# Patient Record
Sex: FEMALE | Race: WHITE | URBAN - METROPOLITAN AREA
[De-identification: names, ages, dates, MRNs, and addresses within clinical notes are randomized per-mention and may not be internally consistent; named-entity substitution may affect disease eponyms.]

---

## 2021-06-11 ENCOUNTER — OFFICE VISIT (OUTPATIENT)
Dept: INTERNAL MEDICINE CLINIC | Age: 31
End: 2021-06-11
Payer: COMMERCIAL

## 2021-06-11 VITALS
SYSTOLIC BLOOD PRESSURE: 101 MMHG | OXYGEN SATURATION: 100 % | HEIGHT: 70 IN | TEMPERATURE: 98.6 F | HEART RATE: 65 BPM | BODY MASS INDEX: 21.86 KG/M2 | DIASTOLIC BLOOD PRESSURE: 76 MMHG | WEIGHT: 152.7 LBS | RESPIRATION RATE: 16 BRPM

## 2021-06-11 DIAGNOSIS — Z76.89 ESTABLISHING CARE WITH NEW DOCTOR, ENCOUNTER FOR: Primary | ICD-10-CM

## 2021-06-11 DIAGNOSIS — F32.A ANXIETY AND DEPRESSION: ICD-10-CM

## 2021-06-11 DIAGNOSIS — Z30.41 USES ORAL CONTRACEPTION: ICD-10-CM

## 2021-06-11 DIAGNOSIS — F41.9 ANXIETY AND DEPRESSION: ICD-10-CM

## 2021-06-11 DIAGNOSIS — Z79.899 ENCOUNTER FOR LONG-TERM (CURRENT) USE OF OTHER MEDICATIONS: ICD-10-CM

## 2021-06-11 DIAGNOSIS — Z13.220 LIPID SCREENING: ICD-10-CM

## 2021-06-11 PROCEDURE — 99203 OFFICE O/P NEW LOW 30 MIN: CPT | Performed by: FAMILY MEDICINE

## 2021-06-11 RX ORDER — NORETHINDRONE ACETATE AND ETHINYL ESTRADIOL 1MG-20(21)
1 KIT ORAL DAILY
COMMUNITY
End: 2022-04-27 | Stop reason: SDUPTHER

## 2021-06-11 RX ORDER — FLUOXETINE HYDROCHLORIDE 20 MG/1
1 CAPSULE ORAL DAILY
COMMUNITY
End: 2021-08-19 | Stop reason: SDUPTHER

## 2021-06-11 NOTE — PROGRESS NOTES
1. Have you been to the ER, urgent care clinic since your last visit? Hospitalized since your last visit? No    2. Have you seen or consulted any other health care providers outside of the [address] [place] [address] since your last visit? Include any pap smears or colon screening. No  Chief Complaint   Patient presents with   [address]     establishing a new pcp     Visit Vitals  /76   Pulse 65   Temp 98.6 °F (37 °C) (Oral)   Resp 16   Ht 5' 10\" (1.778 m)   Wt 152 lb 11.2 oz (69.3 kg)   SpO2 100%   BMI 21.91 kg/m²     Pt has PCOS, this is why she is on birth control.

## 2021-06-11 NOTE — PROGRESS NOTES
SPORTS MEDICINE [facility] PRIMARY [facility]ARE  Linda Robles MD  [address]  [id number]    Chief Complaint   Patient presents with   [address]     establishing a new pcp       SUBJECTIVE:    Sylvester Arboleda is a 36 y.o. female for new patient visit. Past medical history of anxiety, depression and OCP therapy. Current Outpatient Medications   Medication Sig Dispense Refill    FLUoxetine (PROzac) 20 mg capsule Take 1 Capsule by mouth daily.  norethindrone-ethinyl estradiol (JUNEL FE 1/20) 1 mg-20 mcg (21)/75 mg (7) tab Take 1 Tablet by mouth daily. Past Medical History:   Diagnosis Date    Depression     PCOS (polycystic ovarian syndrome)      History reviewed. No pertinent surgical history.   No Known Allergies    REVIEW OF SYSTEMS:  General: negative for - chills or fever  ENT: negative for - headaches, nasal congestion, tinnitus, hearing loss, vision changes, sore throat  Respiratory: negative for - cough, hemoptysis, shortness of breath or wheezing  Cardiovascular : negative for - chest pain, edema, palpitations or shortness of breath  Gastrointestinal: negative for - abdominal pain, blood in stools, heartburn or nausea/vomiting, diarrhea, constipation  Genito-Urinary: menstrual irregularity; no dysuria, trouble voiding, hematuria or   Musculoskeletal: negative for - gait disturbance, joint pain, joint stiffness , joint swelling, muscle aches  Neurological: no TIA or stroke symptoms  Hematologic: no bruises, no bleeding, no swollen glands  Integument: no lumps, mole changes, nail changes or rash  Endocrine:lethargy; no malaise/ or unexpected weight changes      Social History     Socioeconomic History    Marital status: SINGLE     Spouse name: Not on file    Number of children: Not on file    Years of education: Not on file 22 yr        loking for work   [employer] education level: Not on file   Tobacco Use    Smoking status: Never Smoker    Smokeless tobacco: Never Used   Vaping Use  Vaping Use: Never used   Substance and Sexual Activity    Alcohol use: Yes     Alcohol/week: 2.0 standard drinks     Types: 2 Glasses of wine per week     Comment: socially    Drug use: Never    Sexual activity: Yes     Partners: Male     Birth control/protection: Pill     Social Determinants of Health     Financial Resource Strain:     Difficulty of Paying Living Expenses:    Food Insecurity:     Worried About Running Out of Food in the Last Year:     [address] in the Last Year:    Transportation Needs:     Lack of Transportation (Medical):  Lack of Transportation (Non-Medical):    Physical Activity:     Days of Exercise per Week:     Minutes of Exercise per Session:    Stress:     Feeling of Stress :    Social Connections:     Frequency of Communication with Friends and Family:     Frequency of Social Gatherings with Friends and Family:     Attends Mosque Services:     Active Member of Clubs or Organizations:     Attends Club or Organization Meetings:     Marital Status:      Family History   Problem Relation Age of Onset    Deep Vein Thrombosis Father     Psychiatric Disorder Sister        OBJECTIVE:     Visit Vitals  /76   Pulse 65   Temp 98.6 °F (37 °C) (Oral)   Resp 16   Ht 5' 10\" (1.778 m)   Wt 152 lb 11.2 oz (69.3 kg)   LMP 05/23/2021 (Exact Date)   SpO2 100%   BMI 21.91 kg/m²     CONSTITUTIONAL: well developed, well nourished, appears age appropriate  EYES: perrla, eom intact  ENMT:moist mucous membranes, pharynx clear  NECK: supple. Thyroid normal  RESPIRATORY: Chest: clear bilaterally  CARDIOVASCULAR: Heart: regular rate and rhythm, pulse 2+  GASTROINTESTINAL: Abdomen: soft, bowel sounds active  HEMATOLOGIC: no pathological lymph nodes palpated  MUSCULOSKELETAL: Extremities: no edema,    INTEGUMENT: No unusual rashes or suspicious  lesions on visible skin noted.  Nails appear normal.  NEUROLOGIC: non-focal exam   MENTAL STATUS: alert and oriented, appropriate affect Office Visit on 06/11/2021   Component Date Value Ref Range Status    Color 06/11/2021 YELLOW/STRAW    Final    Color Reference Range: Straw, Yellow or Dark Yellow    Appearance 06/11/2021 CLOUDY* CLEAR   Final    Specific gravity 06/11/2021 1.008  1.003 - 1.030   Final    pH (UA) 06/11/2021 7.5  5.0 - 8.0   Final    Protein 06/11/2021 Negative  Negative mg/dL Final    Glucose 06/11/2021 Negative  Negative mg/dL Final    Ketone 06/11/2021 Negative  Negative mg/dL Final    Bilirubin 06/11/2021 Negative  Negative   Final    Blood 06/11/2021 SMALL* Negative   Final    Urobilinogen 06/11/2021 0.2  0.2 - 1.0 EU/dL Final    Nitrites 06/11/2021 Negative  Negative   Final    Leukocyte Esterase 06/11/2021 TRACE* Negative   Final    WBC 06/11/2021 5-10  0 - 4 /hpf Final    RBC 06/11/2021 0-5  0 - 5 /hpf Final    Epithelial cells 06/11/2021 MODERATE* FEW /lpf Final    Bacteria 06/11/2021 3+* Negative /hpf Final    Cholesterol, total 06/11/2021 208* <200 MG/DL Final    Triglyceride 06/11/2021 68  <150 MG/DL Final    Comment: Based on NCEP-ATP III:  Triglycerides <150 mg/dL  is considered normal, 150-199  mg/dL  borderline high,  200-499 mg/dL high and  greater than or equal to 500  mg/dL very high.  HDL Cholesterol 06/11/2021 93  MG/DL Final    Comment: Based on NCEP ATP III, HDL Cholesterol <40 mg/dL is considered low and >60  mg/dL is elevated.       LDL, calculated 06/11/2021 101.4* 0 - 100 MG/DL Final    Comment: Based on the NCEP-ATP: LDL-C concentrations are considered  optimal <100 mg/dL,  near optimal/above Normal 100-129 mg/dL Borderline High: 130-159, High: 160-189  mg/dL Very High: Greater than or equal to 190 mg/dL      VLDL, calculated 06/11/2021 13.6  MG/DL Final    CHOL/HDL Ratio 06/11/2021 2.2  0.0 - 5.0   Final    Sodium 06/11/2021 137  136 - 145 mmol/L Final    Potassium 06/11/2021 4.6  3.5 - 5.1 mmol/L Final    Chloride 06/11/2021 105  97 - 108 mmol/L Final    CO2 06/11/2021 27  21 - 32 mmol/L Final    Anion gap 06/11/2021 5  5 - 15 mmol/L Final    Glucose 06/11/2021 80  65 - 100 mg/dL Final    BUN 06/11/2021 13  6 - 20 MG/DL Final    Creatinine 06/11/2021 0.78  0.55 - 1.02 MG/DL Final    BUN/Creatinine ratio 06/11/2021 17  12 - 20   Final    GFR est AA 06/11/2021 >60  >60 ml/min/1.73m2 Final    GFR est non-AA 06/11/2021 >60  >60 ml/min/1.73m2 Final    Comment: Estimated GFR is calculated using the IDMS-traceable Modification of Diet in  Renal Disease (MDRD) Study equation, reported for both  Americans  (GFRAA) and non- Americans (GFRNA), and normalized to 1.73m2 body  surface area. The physician must decide which value applies to the patient.  Calcium 06/11/2021 9.6  8.5 - 10.1 MG/DL Final    Bilirubin, total 06/11/2021 1.4* 0.2 - 1.0 MG/DL Final    ALT (SGPT) 06/11/2021 21  12 - 78 U/L Final    AST (SGOT) 06/11/2021 15  15 - 37 U/L Final    Alk.  phosphatase 06/11/2021 36* 45 - 117 U/L Final    Protein, total 06/11/2021 8.0  6.4 - 8.2 g/dL Final    Albumin 06/11/2021 4.4  3.5 - 5.0 g/dL Final    Globulin 06/11/2021 3.6  2.0 - 4.0 g/dL Final    A-G Ratio 06/11/2021 1.2  1.1 - 2.2   Final    WBC 06/11/2021 4.6  3.6 - 11.0 K/uL Final    RBC 06/11/2021 4.56  3.80 - 5.20 M/uL Final    HGB 06/11/2021 13.9  11.5 - 16.0 g/dL Final    HCT 06/11/2021 41.5  35.0 - 47.0 % Final    MCV 06/11/2021 91.0  80.0 - 99.0 FL Final    MCH 06/11/2021 30.5  26.0 - 34.0 PG Final    MCHC 06/11/2021 33.5  30.0 - 36.5 g/dL Final    RDW 06/11/2021 12.8  11.5 - 14.5 % Final    PLATELET 71/23/3188 215* 150 - 400 K/uL Final    RESULTS VERIFIED BY SMEAR    NRBC 06/11/2021 0.0  0  WBC Final    ABSOLUTE NRBC 06/11/2021 0.00  0.00 - 0.01 K/uL Final    NEUTROPHILS 06/11/2021 61  32 - 75 % Final    LYMPHOCYTES 06/11/2021 31  12 - 49 % Final    MONOCYTES 06/11/2021 6  5 - 13 % Final    EOSINOPHILS 06/11/2021 1  0 - 7 % Final    BASOPHILS 06/11/2021 1  0 - 1 % Final  IMMATURE GRANULOCYTES 06/11/2021 0  0.0 - 0.5 % Final    ABS. NEUTROPHILS 06/11/2021 2.8  1.8 - 8.0 K/UL Final    ABS. LYMPHOCYTES 06/11/2021 1.4  0.8 - 3.5 K/UL Final    ABS. MONOCYTES 06/11/2021 0.3  0.0 - 1.0 K/UL Final    ABS. EOSINOPHILS 06/11/2021 0.1  0.0 - 0.4 K/UL Final    ABS. BASOPHILS 06/11/2021 0.0  0.0 - 0.1 K/UL Final    ABS. IMM. GRANS. 06/11/2021 0.0  0.00 - 0.04 K/UL Final    DF 06/11/2021 AUTOMATED    Final       ASSESSMENT:   1. Establishing care with new doctor, encounter for    2. Encounter for long-term (current) use of other medications    3. Lipid screening    4. Anxiety and depression -stable   5. Uses oral contraception -stable         PLAN:  .  Orders Placed This Encounter    CBC WITH AUTOMATED DIFF    METABOLIC PANEL, COMPREHENSIVE    LIPID PANEL    URINALYSIS W/ RFLX MICROSCOPIC    FLUoxetine (PROzac) 20 mg capsule    norethindrone-ethinyl estradiol (JUNEL FE 1/20) 1 mg-20 mcg (21)/75 mg (7) tab           I have discussed the diagnosis with the patient and the intended plan as seen in the  orders above. The patient understands and agrees with the plan. The patient has   received an after visit summary. Questions were answered concerning  future plans  Patient labs and/or xrays were reviewed as available. Past records were reviewed as available. Counseled regarding  healthy lifestyle          Advised patient to call back or return to office if symptoms develop/worsen/change/persist.           Serene Guillen M.D. This note was created using voice recognition software.   Edits have been made but syntax errors might exist.

## 2021-06-12 LAB
ALBUMIN SERPL-MCNC: 4.4 G/DL (ref 3.5–5)
ALBUMIN/GLOB SERPL: 1.2 {RATIO} (ref 1.1–2.2)
ALP SERPL-CCNC: 36 U/L (ref 45–117)
ALT SERPL-CCNC: 21 U/L (ref 12–78)
ANION GAP SERPL CALC-SCNC: 5 MMOL/L (ref 5–15)
APPEARANCE UR: ABNORMAL
AST SERPL-CCNC: 15 U/L (ref 15–37)
BACTERIA URNS QL MICRO: ABNORMAL /HPF
BASOPHILS # BLD: 0 K/UL (ref 0–0.1)
BASOPHILS NFR BLD: 1 % (ref 0–1)
BILIRUB SERPL-MCNC: 1.4 MG/DL (ref 0.2–1)
BILIRUB UR QL: NEGATIVE
BUN SERPL-MCNC: 13 MG/DL (ref 6–20)
BUN/CREAT SERPL: 17 (ref 12–20)
CALCIUM SERPL-MCNC: 9.6 MG/DL (ref 8.5–10.1)
CHLORIDE SERPL-SCNC: 105 MMOL/L (ref 97–108)
CHOLEST SERPL-MCNC: 208 MG/DL
CO2 SERPL-SCNC: 27 MMOL/L (ref 21–32)
COLOR UR: ABNORMAL
CREAT SERPL-MCNC: 0.78 MG/DL (ref 0.55–1.02)
DIFFERENTIAL METHOD BLD: ABNORMAL
EOSINOPHIL # BLD: 0.1 K/UL (ref 0–0.4)
EOSINOPHIL NFR BLD: 1 % (ref 0–7)
EPITH CASTS URNS QL MICRO: ABNORMAL /LPF
ERYTHROCYTE [DISTWIDTH] IN BLOOD BY AUTOMATED COUNT: 12.8 % (ref 11.5–14.5)
GLOBULIN SER CALC-MCNC: 3.6 G/DL (ref 2–4)
GLUCOSE SERPL-MCNC: 80 MG/DL (ref 65–100)
GLUCOSE UR STRIP.AUTO-MCNC: NEGATIVE MG/DL
HCT VFR BLD AUTO: 41.5 % (ref 35–47)
HDLC SERPL-MCNC: 93 MG/DL
HDLC SERPL: 2.2 {RATIO} (ref 0–5)
HGB BLD-MCNC: 13.9 G/DL (ref 11.5–16)
HGB UR QL STRIP: ABNORMAL
IMM GRANULOCYTES # BLD AUTO: 0 K/UL (ref 0–0.04)
IMM GRANULOCYTES NFR BLD AUTO: 0 % (ref 0–0.5)
KETONES UR QL STRIP.AUTO: NEGATIVE MG/DL
LDLC SERPL CALC-MCNC: 101.4 MG/DL (ref 0–100)
LEUKOCYTE ESTERASE UR QL STRIP.AUTO: ABNORMAL
LYMPHOCYTES # BLD: 1.4 K/UL (ref 0.8–3.5)
LYMPHOCYTES NFR BLD: 31 % (ref 12–49)
MCH RBC QN AUTO: 30.5 PG (ref 26–34)
MCHC RBC AUTO-ENTMCNC: 33.5 G/DL (ref 30–36.5)
MCV RBC AUTO: 91 FL (ref 80–99)
MONOCYTES # BLD: 0.3 K/UL (ref 0–1)
MONOCYTES NFR BLD: 6 % (ref 5–13)
NEUTS SEG # BLD: 2.8 K/UL (ref 1.8–8)
NEUTS SEG NFR BLD: 61 % (ref 32–75)
NITRITE UR QL STRIP.AUTO: NEGATIVE
NRBC # BLD: 0 K/UL (ref 0–0.01)
NRBC BLD-RTO: 0 PER 100 WBC
PH UR STRIP: 7.5 [PH] (ref 5–8)
PLATELET # BLD AUTO: 138 K/UL (ref 150–400)
POTASSIUM SERPL-SCNC: 4.6 MMOL/L (ref 3.5–5.1)
PROT SERPL-MCNC: 8 G/DL (ref 6.4–8.2)
PROT UR STRIP-MCNC: NEGATIVE MG/DL
RBC # BLD AUTO: 4.56 M/UL (ref 3.8–5.2)
RBC #/AREA URNS HPF: ABNORMAL /HPF (ref 0–5)
SODIUM SERPL-SCNC: 137 MMOL/L (ref 136–145)
SP GR UR REFRACTOMETRY: 1.01 (ref 1–1.03)
TRIGL SERPL-MCNC: 68 MG/DL (ref ?–150)
UROBILINOGEN UR QL STRIP.AUTO: 0.2 EU/DL (ref 0.2–1)
VLDLC SERPL CALC-MCNC: 13.6 MG/DL
WBC # BLD AUTO: 4.6 K/UL (ref 3.6–11)
WBC URNS QL MICRO: ABNORMAL /HPF (ref 0–4)

## 2022-01-06 ENCOUNTER — OFFICE VISIT (OUTPATIENT)
Dept: INTERNAL MEDICINE CLINIC | Age: 32
End: 2022-01-06
Payer: COMMERCIAL

## 2022-01-06 ENCOUNTER — HOSPITAL ENCOUNTER (OUTPATIENT)
Dept: LAB | Age: 32
Discharge: HOME | End: 2022-01-06
Payer: COMMERCIAL

## 2022-01-06 VITALS
SYSTOLIC BLOOD PRESSURE: 106 MMHG | OXYGEN SATURATION: 100 % | HEIGHT: 70 IN | TEMPERATURE: 98.7 F | RESPIRATION RATE: 18 BRPM | BODY MASS INDEX: 24.31 KG/M2 | DIASTOLIC BLOOD PRESSURE: 73 MMHG | HEART RATE: 76 BPM | WEIGHT: 169.8 LBS

## 2022-01-06 DIAGNOSIS — Z11.59 ENCOUNTER FOR HEPATITIS C SCREENING TEST FOR LOW RISK PATIENT: ICD-10-CM

## 2022-01-06 DIAGNOSIS — N89.8 VAGINAL DISCHARGE: ICD-10-CM

## 2022-01-06 DIAGNOSIS — R17 SERUM TOTAL BILIRUBIN ELEVATED: ICD-10-CM

## 2022-01-06 DIAGNOSIS — Z12.4 CERVICAL CANCER SCREENING: Primary | ICD-10-CM

## 2022-01-06 DIAGNOSIS — D69.6 THROMBOCYTOPENIA (HCC): ICD-10-CM

## 2022-01-06 PROCEDURE — 99214 OFFICE O/P EST MOD 30 MIN: CPT | Performed by: FAMILY MEDICINE

## 2022-01-06 PROCEDURE — 88175 CYTOPATH C/V AUTO FLUID REDO: CPT

## 2022-01-06 NOTE — PATIENT INSTRUCTIONS
Learning About Cervical Cancer Screening  What is a cervical cancer screening test?     The cervix is the lower part of the uterus that opens into the vagina. Cervical cancer screening tests check the cells on the cervix for changes that could lead to cancer. Two tests can be used to screen for cervical cancer. They may be used alone or together. A Pap test.   This test looks for changes in the cells of the cervix. Some of these cell changes could lead to cancer. A human papillomavirus (HPV) test.   This test looks for the HPV virus. Some high-risk types of HPV can cause cell changes that could lead to cervical cancer. When should you have a screening test?  If you have a cervix, you may need cervical cancer screening. Screening will depend on many things. Ages 24 to 34  Screening options for these ages include:  · A Pap test. If your results are normal, you can wait 3 years to have another test.  · An HPV test beginning at age 22. If your results are negative, you can wait 5 years to have another test.  Ages 27 to 59  Screening options for these ages include:  · A Pap test. If your results are normal, you can wait 3 years to have another test.  · An HPV test. If your results are negative, you can wait 5 years to have another test.  · A Pap test and an HPV test. If your results are normal, you can wait 5 years to be tested again. Ages 72 and older  If you are age 72 or older and you've always had normal screening results, you may not need screening. Talk to your doctor. What do the results of cervical cancer screening mean? Your test results may be normal. Or the results may show minor or serious changes to the cells on your cervix. Minor changes may go away on their own, especially if you are younger than 30. You may have an abnormal test because you have an infection of the vagina or cervix or because you have low estrogen levels after menopause that are causing the cells to change.   If you have a high-risk type of human papillomavirus (HPV) or cell changes that could turn into cancer, you may need more tests. Your doctor may suggest that you wait to be retested. Or you may need to have a colposcopy or treatment right away. Your doctor will recommend a follow-up plan based on your results and your age. Follow-up care is a key part of your treatment and safety. Be sure to make and go to all appointments, and call your doctor if you are having problems. It's also a good idea to know your test results and keep a list of the medicines you take. Where can you learn more? Go to http://www.[facility].com/  Enter P919 in the search box to learn more about \"Learning About Cervical Cancer Screening. \"  Current as of: December 17, 2020               Content Version: 13.0  © 1112-0184 [employer]. Care instructions adapted under license by [employer] (which disclaims liability or warranty for this information). If you have questions about a medical condition or this instruction, always ask your healthcare professional. [place] [address] any warranty or liability for your use of this information.   Elevated bilirubin (minimal elevation)( repeated with today's lab)  Low platelet count ( repeated with today's lab)

## 2022-01-06 NOTE — PROGRESS NOTES
Chief Complaint   Patient presents with   Anabelle Vega Exam         1. Have you been to the ER, urgent care clinic since your last visit? Hospitalized since your last visit? No    2. Have you seen or consulted any other health care providers outside of the [address] [place] [address] since your last visit? Include any pap smears or colon screening.  No

## 2022-01-06 NOTE — PROGRESS NOTES
SPORTS MEDICINE [facility] PRIMARY [facility]ARE  Stevan Howell. MD Margaret  [address] [place] [id number]    Chief Complaint   Patient presents with    Gyn Exam       SUBJECTIVE:    Crescencio Curry is a 36 y.o. female for pap smear. LMP : unknown  Postmenopausal bleeding: NA  Menstrual irregularity: yes  Abnormal pap history: no  Last pap smear: 3 yr  Abnormal mammogram history: no  Last mammogram date: na  Colonoscopy: na  DEXA scan and T-score: no prior study  GYN/ diagnoses: PCOS  GYN/ surgery: no  Family hx of female cancer: PGM, breast cancer  Gravity/parity:   Contraception:  yes  STI history: yes  Menarche:  17 yo  Sexually active: yes  Condoms: no  Vaginitis: no  Urinary complaints: no    Current Outpatient Medications   Medication Sig Dispense Refill    FLUoxetine (PROzac) 20 mg capsule Take 1 Capsule by mouth daily. 90 Capsule 1    norethindrone-ethinyl estradiol (JUNEL FE ) 1 mg-20 mcg (21)/75 mg (7) tab Take 1 Tablet by mouth daily. Past Medical History:   Diagnosis Date    Depression     PCOS (polycystic ovarian syndrome)      History reviewed. No pertinent surgical history. No Known Allergies    REVIEW OF SYSTEMS:  Per hpi    Social History     Socioeconomic History    Marital status: SINGLE   Tobacco Use    Smoking status: Never Smoker    Smokeless tobacco: Never Used   Vaping Use    Vaping Use: Never used   Substance and Sexual Activity    Alcohol use:  Yes     Alcohol/week: 2.0 standard drinks     Types: 2 Glasses of wine per week     Comment: socially    Drug use: Never    Sexual activity: Yes     Partners: Male     Birth control/protection: Pill     Family History   Problem Relation Age of Onset    Deep Vein Thrombosis Father     Psychiatric Disorder Sister        OBJECTIVE:     Visit Vitals  /73   Pulse 76   Temp 98.7 °F (37.1 °C) (Oral)   Resp 18   Ht 5' 10\" (1.778 m)   Wt 169 lb 12.8 oz (77 kg)   SpO2 100%   BMI 24.36 kg/m²     CONSTITUTIONAL:pleasant, cooperative, NAD  EYES:  eom intact  NECK: supple. GASTROINTESTINAL: Abdomen: soft  Pelvic -  Vagina with scant amount of  white discharge; normal external genitalia, vulva,cervix, uterus and adnexa   MUSCULOSKELETAL: Extremities: no edema  INTEGUMENT: warm and dry  NEUROLOGIC: non-focal exam   MENTAL STATUS: alert , appropriate affect       ASSESSMENT/PLAN:  1. Cervical cancer screening    2. Vaginal discharge    3. Thrombocytopenia ([address]    4. Serum total bilirubin elevated    5. Encounter for hepatitis C screening test for low risk patient      Repeat platelet count and bilirubin before pursuing a work up. Reviewed common conditions associated with abnormal plt ct and bilirubin. .  Orders Placed This Encounter    BILIRUBIN, FRACTIONATED    PLATELET COUNT    HGB & HCT    HEPATITIS C AB    URINALYSIS W/ REFLEX CULTURE    NUSWAB VAGINITIS PLUS    PAP IG, RFX APTIMA HPV ASCUS ([id number])       Follow-up and Dispositions    · Return in about 6 months (around 7/6/2022) for annual wellness exam.         I have discussed the diagnosis with the patient and the intended plan as seen in the  orders above. The patient understands and agrees with the plan. The patient has   received an after visit summary. Questions were answered concerning  future plans  Patient labs and/or xrays were reviewed as available. Past records were reviewed as available. Counseled regarding  healthy lifestyle         Signed,  Alexa Barthel M.D. This note was created using voice recognition software.   Edits have been made but syntax errors might exist.

## 2022-01-10 LAB
A VAGINAE DNA VAG QL NAA+PROBE: NORMAL SCORE
BVAB2 DNA VAG QL NAA+PROBE: NORMAL SCORE
C ALBICANS DNA VAG QL NAA+PROBE: NEGATIVE
C GLABRATA DNA VAG QL NAA+PROBE: NEGATIVE
C TRACH RRNA SPEC QL NAA+PROBE: NEGATIVE
MEGA1 DNA VAG QL NAA+PROBE: NORMAL SCORE
N GONORRHOEA RRNA SPEC QL NAA+PROBE: NEGATIVE
SPECIMEN SOURCE: NORMAL
T VAGINALIS RRNA SPEC QL NAA+PROBE: NEGATIVE

## 2022-01-11 LAB
APPEARANCE UR: CLEAR
BACTERIA URNS QL MICRO: NEGATIVE /HPF
BILIRUB DIRECT SERPL-MCNC: 0.2 MG/DL (ref 0–0.2)
BILIRUB INDIRECT SERPL-MCNC: 0.7 MG/DL (ref 0–1.1)
BILIRUB SERPL-MCNC: 0.9 MG/DL (ref 0.2–1)
BILIRUB UR QL: NEGATIVE
COLOR UR: NORMAL
EPITH CASTS URNS QL MICRO: NORMAL /LPF
GLUCOSE UR STRIP.AUTO-MCNC: NEGATIVE MG/DL
HCT VFR BLD AUTO: 42.2 % (ref 35–47)
HGB BLD-MCNC: 13.6 G/DL (ref 11.5–16)
HGB UR QL STRIP: NEGATIVE
KETONES UR QL STRIP.AUTO: NEGATIVE MG/DL
LEUKOCYTE ESTERASE UR QL STRIP.AUTO: NEGATIVE
NITRITE UR QL STRIP.AUTO: NEGATIVE
PH UR STRIP: 6.5 [PH] (ref 5–8)
PLATELET # BLD AUTO: 213 K/UL (ref 150–400)
PROT UR STRIP-MCNC: NEGATIVE MG/DL
RBC #/AREA URNS HPF: NORMAL /HPF (ref 0–5)
SP GR UR REFRACTOMETRY: 1.01 (ref 1–1.03)
UA: UC IF INDICATED,UAUC: NORMAL
UROBILINOGEN UR QL STRIP.AUTO: 0.2 EU/DL (ref 0.2–1)
WBC URNS QL MICRO: NORMAL /HPF (ref 0–4)

## 2022-01-13 LAB — HCV AB SERPL QL IA: NONREACTIVE

## 2022-02-05 PROBLEM — Z12.4 CERVICAL CANCER SCREENING: Status: RESOLVED | Noted: 2022-01-06 | Resolved: 2022-02-05

## 2022-03-18 PROBLEM — Z11.59 ENCOUNTER FOR HEPATITIS C SCREENING TEST FOR LOW RISK PATIENT: Status: ACTIVE | Noted: 2022-01-06

## 2022-03-18 PROBLEM — R17 SERUM TOTAL BILIRUBIN ELEVATED: Status: ACTIVE | Noted: 2022-01-06

## 2022-03-19 PROBLEM — D69.6 THROMBOCYTOPENIA (HCC): Status: ACTIVE | Noted: 2022-01-06

## 2022-03-19 PROBLEM — N89.8 VAGINAL DISCHARGE: Status: ACTIVE | Noted: 2022-01-06

## 2022-04-27 DIAGNOSIS — Z79.899 ENCOUNTER FOR LONG-TERM (CURRENT) USE OF OTHER MEDICATIONS: Primary | ICD-10-CM

## 2022-04-27 RX ORDER — FLUOXETINE HYDROCHLORIDE 20 MG/1
20 CAPSULE ORAL DAILY
Qty: 90 CAPSULE | Refills: 1 | Status: SHIPPED | OUTPATIENT
Start: 2022-04-27

## 2022-04-27 RX ORDER — NORETHINDRONE ACETATE AND ETHINYL ESTRADIOL 1MG-20(21)
1 KIT ORAL DAILY
Qty: 3 DOSE PACK | Refills: 3 | Status: SHIPPED | OUTPATIENT
Start: 2022-04-27

## 2022-09-20 RX ORDER — FLUOXETINE HYDROCHLORIDE 20 MG/1
20 CAPSULE ORAL DAILY
Qty: 90 CAPSULE | Refills: 1 | Status: CANCELLED | OUTPATIENT
Start: 2022-09-20

## 2023-05-20 RX ORDER — NORETHINDRONE ACETATE AND ETHINYL ESTRADIOL 1MG-20(21)
1 KIT ORAL DAILY
COMMUNITY
Start: 2022-04-27

## 2023-05-20 RX ORDER — FLUOXETINE HYDROCHLORIDE 20 MG/1
20 CAPSULE ORAL DAILY
COMMUNITY
Start: 2022-04-27